# Patient Record
(demographics unavailable — no encounter records)

---

## 2025-01-28 NOTE — DISCUSSION/SUMMARY
[de-identified] : MRI images were discussed with the patient great detail.  We discussed that most of his pain in the back of the knee is likely coming from meniscal tear and not from Ash's cyst.  We discussed osteoarthritis as well.  Patient may benefit from physical therapy at this time.  If she is not improving, he may come in for steroid injection.

## 2025-01-28 NOTE — PHYSICAL EXAM
[de-identified] : MRI right knee [facility] 1/17/25: IMPRESSION:  1. Complex tear of the body and posterior horn of the lateral meniscus with a posteriorly displaced meniscal fragment. 2. Sprain of the medial meniscocapsular ligament. 3. Tendinosis with focal intratendinous tear of the popliteus and surrounding synovitis. 4. 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 5. Tricompartmental osteoarthritis as above.

## 2025-01-28 NOTE — HISTORY OF PRESENT ILLNESS
[de-identified] : Patient is a 65-year-old male who presents for follow-up via telemedicine.  He gave explicit consent to a remote visit and logged on to the Teams platform. Total time of conversation was 4 minutes and 11 seconds.  I conducted this telemedicine appointment from my office.  Patient was located at home.

## 2025-01-28 NOTE — HISTORY OF PRESENT ILLNESS
[de-identified] : Patient is a 65-year-old male who presents for follow-up via telemedicine.  He gave explicit consent to a remote visit and logged on to the Teams platform. Total time of conversation was 4 minutes and 11 seconds.  I conducted this telemedicine appointment from my office.  Patient was located at home.

## 2025-01-28 NOTE — PHYSICAL EXAM
[de-identified] : MRI right knee [facility] 1/17/25: IMPRESSION:  1. Complex tear of the body and posterior horn of the lateral meniscus with a posteriorly displaced meniscal fragment. 2. Sprain of the medial meniscocapsular ligament. 3. Tendinosis with focal intratendinous tear of the popliteus and surrounding synovitis. 4. 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 5. Tricompartmental osteoarthritis as above.

## 2025-01-28 NOTE — DISCUSSION/SUMMARY
[de-identified] : MRI images were discussed with the patient great detail.  We discussed that most of his pain in the back of the knee is likely coming from meniscal tear and not from Ash's cyst.  We discussed osteoarthritis as well.  Patient may benefit from physical therapy at this time.  If she is not improving, he may come in for steroid injection.

## 2025-03-10 NOTE — PHYSICAL EXAM
[de-identified] : nonobstructive exostoses bilaterally [Normal] : mucosa is normal [Midline] : trachea located in midline position

## 2025-03-10 NOTE — HISTORY OF PRESENT ILLNESS
[de-identified] : Patient presents today c/o hearing loss and for a Audiogram.  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 No pain or drainage. Previous history of left worse than right hearing loss, no prior surgery. He has tinnitus in the left ear.